# Patient Record
Sex: MALE | Race: BLACK OR AFRICAN AMERICAN | NOT HISPANIC OR LATINO | URBAN - METROPOLITAN AREA
[De-identification: names, ages, dates, MRNs, and addresses within clinical notes are randomized per-mention and may not be internally consistent; named-entity substitution may affect disease eponyms.]

---

## 2022-07-09 ENCOUNTER — TELEPHONE ENCOUNTER (OUTPATIENT)
Dept: URBAN - METROPOLITAN AREA CLINIC 121 | Facility: CLINIC | Age: 27
End: 2022-07-09

## 2022-07-10 ENCOUNTER — TELEPHONE ENCOUNTER (OUTPATIENT)
Dept: URBAN - METROPOLITAN AREA CLINIC 121 | Facility: CLINIC | Age: 27
End: 2022-07-10

## 2022-08-03 ENCOUNTER — WEB ENCOUNTER (OUTPATIENT)
Dept: URBAN - METROPOLITAN AREA CLINIC 57 | Facility: CLINIC | Age: 27
End: 2022-08-03

## 2022-08-03 ENCOUNTER — OFFICE VISIT (OUTPATIENT)
Dept: URBAN - METROPOLITAN AREA CLINIC 57 | Facility: CLINIC | Age: 27
End: 2022-08-03
Payer: COMMERCIAL

## 2022-08-03 VITALS
OXYGEN SATURATION: 98 % | SYSTOLIC BLOOD PRESSURE: 138 MMHG | BODY MASS INDEX: 35.25 KG/M2 | TEMPERATURE: 98.2 F | HEART RATE: 58 BPM | WEIGHT: 232.6 LBS | DIASTOLIC BLOOD PRESSURE: 88 MMHG | HEIGHT: 68 IN

## 2022-08-03 DIAGNOSIS — R79.89 ABNORMAL LFTS: ICD-10-CM

## 2022-08-03 DIAGNOSIS — R10.13 EPIGASTRIC ABDOMINAL PAIN: ICD-10-CM

## 2022-08-03 PROCEDURE — 99204 OFFICE O/P NEW MOD 45 MIN: CPT | Performed by: INTERNAL MEDICINE

## 2022-08-03 RX ORDER — LISINOPRIL 40 MG/1
1 TABLET TABLET ORAL ONCE A DAY
Status: ACTIVE | COMMUNITY

## 2022-08-03 NOTE — HPI-TODAY'S VISIT:
Referred here for evaluation of abnormal LFTs and fatty liver. He has been complaining of some abdominal bloating and discomfort especially if he eats certain heavy foods. During his work-up, he was noted to have elevated transaminases, had Ultrasound which showed some mild fatty infiltration. He states that he had a FibroScan done this morning. He also had an acute hepatitis profile done which was negative. Patient denies any signs or symptoms of decompensated liver disease. He does have occasional indigestion and reflux depending on what he eats. No history of alcohol excess. He does take a protein supplement once a day. Denies any OTC supplements or bodybuilding supplements. He does tend to gain weight but he states its mostly muscle mass.

## 2022-08-18 LAB — IMMUNOGLOBULIN A: (no result)

## 2022-08-24 LAB
% SATURATION: 34
A/G RATIO: 1.8
ACTIN (SMOOTH MUSCLE) ANTIBODY (IGG): <20
ALBUMIN: 4.6
ALKALINE PHOSPHATASE: 41
ALPHA-1-ANTITRYPSIN (AAT) PHENOTYPE: (no result)
ALT (SGPT): 19
ANA SCREEN, IFA: NEGATIVE
AST (SGOT): 22
BILIRUBIN, TOTAL: 0.5
BUN/CREATININE RATIO: (no result)
BUN: 14
CALCIUM: 9.7
CARBON DIOXIDE, TOTAL: 27
CHLORIDE: 103
CREATININE: 1
EGFR: 104
FERRITIN: 232
GLOBULIN, TOTAL: 2.6
GLUCOSE: 85
HEREDITARY HEMOCHROMATOSIS DNA MUT: (no result)
IRON BINDING CAPACITY: 313
IRON, TOTAL: 107
MITOCHONDRIAL (M2) ANTIBODY: <=20
POTASSIUM: 4.3
PROTEIN, TOTAL: 7.2
RHEUMATOID FACTOR: <14
SJOGREN'S ANTIBODY (SS-A): (no result)
SJOGREN'S ANTIBODY (SS-B): (no result)
SODIUM: 140

## 2022-08-30 ENCOUNTER — TELEPHONE ENCOUNTER (OUTPATIENT)
Dept: URBAN - METROPOLITAN AREA SURGERY CENTER 4 | Facility: SURGERY CENTER | Age: 27
End: 2022-08-30

## 2022-08-30 ENCOUNTER — OFFICE VISIT (OUTPATIENT)
Dept: URBAN - METROPOLITAN AREA CLINIC 63 | Facility: CLINIC | Age: 27
End: 2022-08-30

## 2022-08-30 RX ORDER — LISINOPRIL 40 MG/1
1 TABLET TABLET ORAL ONCE A DAY
COMMUNITY

## 2022-08-30 NOTE — HPI-PREVIOUS IMAGING
Ultrasound abdomen 06/13/2022 Impression: 1.  Increased echogenicity of the hepatic parenchyma suggestive of hepatic steatosis and-or hepatocellular disease.  Correlation with FibroScan recommended.

## 2022-08-30 NOTE — HPI-PREVIOUS LABS
Laboratory results 08/12/2022 CMP unremarkable Smooth muscle antibody negative Mitochondrial antibody negative Alpha-1 antitrypsin phenotype PI MM LISSETH screen negative Iron indices and ferritin within normal limits Hereditary hemochromatosis DNA mutation negative  Laboratory results 06/08/2022 CBC unremarkable CMP  ALT 60 Hemoglobin A1c within normal limits TSH within normal limits Acute hepatitis panel negative for hepatitis A, B and C

## 2022-08-30 NOTE — HPI-TODAY'S VISIT:
Mr. Ramos is a pleasant 27-year-old male evaluated in follow-up of abnormal LFTs and fatty liver.  He was previously evaluated 08/03/2022 as a new patient with complaint of bloating and abdominal discomfort if eating heavy heavy foods.  Incidental finding of elevated transaminases and fatty liver.  He reported acute hepatitis profile was negative. Denied history of excess alcohol.  Admitted taking daily protein supplement, denied additional OTC supplements. Labs were ordered to rule out autoimmune hepatitis and hemochromatosis. He was advised to lose weight, consider taking vitamin E 400 unit daily, trial of Pepcid OTC.

## 2023-03-21 ENCOUNTER — TELEPHONE ENCOUNTER (OUTPATIENT)
Dept: URBAN - METROPOLITAN AREA CLINIC 63 | Facility: CLINIC | Age: 28
End: 2023-03-21

## 2023-03-29 ENCOUNTER — WEB ENCOUNTER (OUTPATIENT)
Dept: URBAN - METROPOLITAN AREA CLINIC 60 | Facility: CLINIC | Age: 28
End: 2023-03-29

## 2023-03-29 ENCOUNTER — OFFICE VISIT (OUTPATIENT)
Dept: URBAN - METROPOLITAN AREA CLINIC 60 | Facility: CLINIC | Age: 28
End: 2023-03-29
Payer: COMMERCIAL

## 2023-03-29 ENCOUNTER — LAB OUTSIDE AN ENCOUNTER (OUTPATIENT)
Dept: URBAN - METROPOLITAN AREA CLINIC 60 | Facility: CLINIC | Age: 28
End: 2023-03-29

## 2023-03-29 VITALS
HEIGHT: 68 IN | BODY MASS INDEX: 33.56 KG/M2 | HEART RATE: 62 BPM | TEMPERATURE: 98.4 F | SYSTOLIC BLOOD PRESSURE: 126 MMHG | DIASTOLIC BLOOD PRESSURE: 74 MMHG | WEIGHT: 221.4 LBS | OXYGEN SATURATION: 100 %

## 2023-03-29 DIAGNOSIS — K76.0 FATTY LIVER: ICD-10-CM

## 2023-03-29 DIAGNOSIS — R12 HEARTBURN: ICD-10-CM

## 2023-03-29 PROBLEM — 197321007: Status: ACTIVE | Noted: 2023-03-29

## 2023-03-29 PROCEDURE — 99214 OFFICE O/P EST MOD 30 MIN: CPT | Performed by: NURSE PRACTITIONER

## 2023-03-29 RX ORDER — LISINOPRIL 40 MG/1
1 TABLET TABLET ORAL ONCE A DAY
Status: ACTIVE | COMMUNITY

## 2023-03-29 RX ORDER — OMEPRAZOLE 40 MG/1
1 CAPSULE 30 MINUTES BEFORE MORNING MEAL CAPSULE, DELAYED RELEASE ORAL ONCE A DAY
Qty: 30 | Refills: 2 | OUTPATIENT
Start: 2023-03-29

## 2023-03-29 RX ORDER — CLONIDINE 0.1 MG/D
1 PATCH TO SKIN PATCH TRANSDERMAL
Status: ACTIVE | COMMUNITY

## 2023-03-29 NOTE — PHYSICAL EXAM GASTROINTESTINAL
Abdomen , soft, moderate tenderness in the epigastrium and ruq, positive og's sign, nondistended , no guarding or rigidity , no masses palpable , normal bowel sounds , Liver and Spleen , no hepatomegaly present , no hepatosplenomegaly , spleen not palpable, No Ascites, No hernia

## 2023-03-29 NOTE — HPI-ABDOMINAL PAIN
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 Feels heartburn and regurgitation most days. Occasional nausea. Some chest pressure depending on positioning. Tender in the epigastrium and ruq. Positive og's sign but more tender in epigastrium CTA of abd unremarkable other than dialated cbd, fu mri/mrcp unremarkable. US abd shows hyperechoic liver but otherwise normal. 02/2023 Fibroscan s0/f0. BMI is high but very muscular and works actively. Has not tried any acid blockers, no h pylori testing or prior egd. Symptoms all worse with food. Transferred care from [facility]

## 2023-04-12 ENCOUNTER — TELEPHONE ENCOUNTER (OUTPATIENT)
Dept: URBAN - METROPOLITAN AREA CLINIC 60 | Facility: CLINIC | Age: 28
End: 2023-04-12

## 2023-04-13 ENCOUNTER — TELEPHONE ENCOUNTER (OUTPATIENT)
Dept: URBAN - METROPOLITAN AREA CLINIC 60 | Facility: CLINIC | Age: 28
End: 2023-04-13

## 2023-04-17 ENCOUNTER — TELEPHONE ENCOUNTER (OUTPATIENT)
Dept: URBAN - METROPOLITAN AREA CLINIC 60 | Facility: CLINIC | Age: 28
End: 2023-04-17

## 2023-04-19 ENCOUNTER — TELEPHONE ENCOUNTER (OUTPATIENT)
Dept: URBAN - METROPOLITAN AREA CLINIC 60 | Facility: CLINIC | Age: 28
End: 2023-04-19

## 2023-05-08 ENCOUNTER — CLAIMS CREATED FROM THE CLAIM WINDOW (OUTPATIENT)
Dept: URBAN - METROPOLITAN AREA SURGERY CENTER 4 | Facility: SURGERY CENTER | Age: 28
End: 2023-05-08
Payer: COMMERCIAL

## 2023-05-08 DIAGNOSIS — K31.89 ACQUIRED DEFORMITY OF DUODENUM: ICD-10-CM

## 2023-05-08 DIAGNOSIS — K20.90 ACUTE ESOPHAGITIS: ICD-10-CM

## 2023-05-08 DIAGNOSIS — K44.9 DIAPHRAGMATIC HERNIA: ICD-10-CM

## 2023-05-08 PROCEDURE — 43239 EGD BIOPSY SINGLE/MULTIPLE: CPT | Performed by: INTERNAL MEDICINE

## 2023-05-08 RX ORDER — OMEPRAZOLE 40 MG/1
1 CAPSULE 30 MINUTES BEFORE MORNING MEAL CAPSULE, DELAYED RELEASE ORAL ONCE A DAY
Qty: 30 | Refills: 2 | Status: ACTIVE | COMMUNITY
Start: 2023-03-29

## 2023-05-08 RX ORDER — CLONIDINE 0.1 MG/D
1 PATCH TO SKIN PATCH TRANSDERMAL
Status: ACTIVE | COMMUNITY

## 2023-05-08 RX ORDER — LISINOPRIL 40 MG/1
1 TABLET TABLET ORAL ONCE A DAY
Status: ACTIVE | COMMUNITY

## 2023-05-18 ENCOUNTER — OFFICE VISIT (OUTPATIENT)
Dept: URBAN - METROPOLITAN AREA CLINIC 60 | Facility: CLINIC | Age: 28
End: 2023-05-18
Payer: COMMERCIAL

## 2023-05-18 ENCOUNTER — LAB OUTSIDE AN ENCOUNTER (OUTPATIENT)
Dept: URBAN - METROPOLITAN AREA CLINIC 60 | Facility: CLINIC | Age: 28
End: 2023-05-18

## 2023-05-18 VITALS
TEMPERATURE: 98 F | HEART RATE: 60 BPM | DIASTOLIC BLOOD PRESSURE: 70 MMHG | HEIGHT: 68 IN | OXYGEN SATURATION: 98 % | WEIGHT: 224 LBS | SYSTOLIC BLOOD PRESSURE: 122 MMHG | BODY MASS INDEX: 33.95 KG/M2

## 2023-05-18 DIAGNOSIS — R10.11 RIGHT UPPER QUADRANT PAIN: ICD-10-CM

## 2023-05-18 DIAGNOSIS — K20.90 ESOPHAGITIS: ICD-10-CM

## 2023-05-18 PROCEDURE — 99214 OFFICE O/P EST MOD 30 MIN: CPT | Performed by: NURSE PRACTITIONER

## 2023-05-18 RX ORDER — CLONIDINE 0.1 MG/D
1 PATCH TO SKIN PATCH TRANSDERMAL
Status: ACTIVE | COMMUNITY

## 2023-05-18 RX ORDER — LISINOPRIL 40 MG/1
1 TABLET TABLET ORAL ONCE A DAY
Status: ACTIVE | COMMUNITY

## 2023-05-18 RX ORDER — OMEPRAZOLE 40 MG/1
1 CAPSULE 30 MINUTES BEFORE MORNING MEAL CAPSULE, DELAYED RELEASE ORAL ONCE A DAY
Qty: 30 | Refills: 2 | Status: ACTIVE | COMMUNITY
Start: 2023-03-29

## 2023-05-18 NOTE — HPI-ABDOMINAL PAIN
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 Was feeling heartburn and regurgitation most days but this has resolved with dialy ppi.  Now still having RUQ pain 60% of days in the last month. Occasional nausea if the pain is very bad. Epigastric tenderness has resolved, negative og's sign today but positive last visit. When the ruq pain is bad it hurts to take a deep breath. This pain seems to be most dietary in nature, if he eats at home he tends not to get the pain.  CTA of abd unremarkable other than dialated cbd, fu mri/mrcp unremarkable. US abd shows hyperechoic liver but otherwise normal. 02/2023 Fibroscan s0/f0. BMI is high but very muscular. Symptoms all worse with food.

## 2023-05-18 NOTE — HPI-ENDOSCOPY (EGD) FOLLOWUP
05/2023 LA grade A esophagitis with chronic cardititis and polypoid change on pathology H pylori negative.

## 2023-06-16 ENCOUNTER — TELEPHONE ENCOUNTER (OUTPATIENT)
Dept: URBAN - METROPOLITAN AREA CLINIC 60 | Facility: CLINIC | Age: 28
End: 2023-06-16

## 2023-06-21 ENCOUNTER — TELEPHONE ENCOUNTER (OUTPATIENT)
Dept: URBAN - METROPOLITAN AREA CLINIC 60 | Facility: CLINIC | Age: 28
End: 2023-06-21

## 2023-06-22 PROBLEM — 20824003: Status: ACTIVE | Noted: 2023-06-22

## 2023-06-26 ENCOUNTER — OFFICE VISIT (OUTPATIENT)
Dept: URBAN - METROPOLITAN AREA CLINIC 63 | Facility: CLINIC | Age: 28
End: 2023-06-26

## 2023-07-10 ENCOUNTER — OFFICE VISIT (OUTPATIENT)
Dept: URBAN - METROPOLITAN AREA CLINIC 63 | Facility: CLINIC | Age: 28
End: 2023-07-10

## 2023-07-13 ENCOUNTER — ERX REFILL RESPONSE (OUTPATIENT)
Dept: URBAN - METROPOLITAN AREA CLINIC 60 | Facility: CLINIC | Age: 28
End: 2023-07-13

## 2023-07-13 RX ORDER — OMEPRAZOLE 40 MG/1
1 CAPSULE 30 MINUTES BEFORE MORNING MEAL CAPSULE, DELAYED RELEASE ORAL ONCE A DAY
Qty: 30 | Refills: 2 | OUTPATIENT

## 2023-07-13 RX ORDER — OMEPRAZOLE 40 MG/1
TAKE 1 CAPSULE BY MOUTH EVERY DAY 30 MINUTES BEFORE BREAKFAST CAPSULE, DELAYED RELEASE ORAL
Qty: 30 CAPSULE | Refills: 11 | OUTPATIENT

## 2023-08-04 ENCOUNTER — OFFICE VISIT (OUTPATIENT)
Dept: URBAN - METROPOLITAN AREA CLINIC 63 | Facility: CLINIC | Age: 28
End: 2023-08-04

## 2023-08-29 ENCOUNTER — OFFICE VISIT (OUTPATIENT)
Dept: URBAN - METROPOLITAN AREA CLINIC 63 | Facility: CLINIC | Age: 28
End: 2023-08-29

## 2023-08-30 ENCOUNTER — OFFICE VISIT (OUTPATIENT)
Dept: URBAN - METROPOLITAN AREA CLINIC 60 | Facility: CLINIC | Age: 28
End: 2023-08-30

## 2023-09-15 ENCOUNTER — OFFICE VISIT (OUTPATIENT)
Dept: URBAN - METROPOLITAN AREA CLINIC 63 | Facility: CLINIC | Age: 28
End: 2023-09-15

## 2023-10-31 ENCOUNTER — TELEPHONE ENCOUNTER (OUTPATIENT)
Dept: URBAN - METROPOLITAN AREA CLINIC 60 | Facility: CLINIC | Age: 28
End: 2023-10-31

## 2023-11-02 ENCOUNTER — LAB OUTSIDE AN ENCOUNTER (OUTPATIENT)
Dept: URBAN - METROPOLITAN AREA CLINIC 60 | Facility: CLINIC | Age: 28
End: 2023-11-02

## 2023-11-06 ENCOUNTER — TELEPHONE ENCOUNTER (OUTPATIENT)
Dept: URBAN - METROPOLITAN AREA CLINIC 64 | Facility: CLINIC | Age: 28
End: 2023-11-06

## 2023-11-06 ENCOUNTER — TELEPHONE ENCOUNTER (OUTPATIENT)
Dept: URBAN - METROPOLITAN AREA CLINIC 63 | Facility: CLINIC | Age: 28
End: 2023-11-06

## 2023-11-07 ENCOUNTER — LAB OUTSIDE AN ENCOUNTER (OUTPATIENT)
Dept: URBAN - METROPOLITAN AREA CLINIC 64 | Facility: CLINIC | Age: 28
End: 2023-11-07

## 2023-11-22 ENCOUNTER — TELEPHONE ENCOUNTER (OUTPATIENT)
Dept: URBAN - METROPOLITAN AREA CLINIC 60 | Facility: CLINIC | Age: 28
End: 2023-11-22

## 2023-11-29 ENCOUNTER — LAB OUTSIDE AN ENCOUNTER (OUTPATIENT)
Dept: URBAN - METROPOLITAN AREA CLINIC 60 | Facility: CLINIC | Age: 28
End: 2023-11-29

## 2023-11-29 ENCOUNTER — OFFICE VISIT (OUTPATIENT)
Dept: URBAN - METROPOLITAN AREA CLINIC 60 | Facility: CLINIC | Age: 28
End: 2023-11-29
Payer: COMMERCIAL

## 2023-11-29 VITALS
TEMPERATURE: 98.8 F | HEIGHT: 68 IN | SYSTOLIC BLOOD PRESSURE: 126 MMHG | OXYGEN SATURATION: 98 % | WEIGHT: 236 LBS | BODY MASS INDEX: 35.77 KG/M2 | DIASTOLIC BLOOD PRESSURE: 78 MMHG | HEART RATE: 60 BPM

## 2023-11-29 DIAGNOSIS — R12 HEARTBURN: ICD-10-CM

## 2023-11-29 DIAGNOSIS — K82.8 GALLBLADDER SLUDGE: ICD-10-CM

## 2023-11-29 DIAGNOSIS — R11.0 NAUSEA: ICD-10-CM

## 2023-11-29 DIAGNOSIS — R10.11 RIGHT UPPER QUADRANT PAIN: ICD-10-CM

## 2023-11-29 PROCEDURE — 99215 OFFICE O/P EST HI 40 MIN: CPT | Performed by: NURSE PRACTITIONER

## 2023-11-29 RX ORDER — OMEPRAZOLE 40 MG/1
1 CAPSULE 30 MINUTES BEFORE MORNING MEAL CAPSULE, DELAYED RELEASE ORAL ONCE A DAY
Qty: 30 | Refills: 2 | OUTPATIENT
Start: 2023-11-29

## 2023-11-29 RX ORDER — LISINOPRIL 40 MG/1
1 TABLET TABLET ORAL ONCE A DAY
Status: ACTIVE | COMMUNITY

## 2023-11-29 RX ORDER — SPIRONOLACTONE 25 MG/1
1 TABLET TABLET, FILM COATED ORAL
Status: ACTIVE | COMMUNITY

## 2023-11-29 NOTE — HPI-ABDOMINAL PAIN
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  CTA of abd unremarkable other than dialated cbd, fu mri/mrcp unremarkable. US abd shows hyperechoic liver but otherwise normal. 02/2023 Fibroscan s0/f0. BMI is high but very muscular. Symptoms all worse with food.

## 2023-11-29 NOTE — HPI-HOSPITAL FOLLOW-UP
Patient seen at [facility] 2 days ago and had us showing gb sludge and amylase of 134. Lipase was 29 and no CT done, normal wbc of 4.8. These results are in no way indicative of acute pancreatitis.  Patient saw Dr Lewis whom gave him ursodiol which he took for 3 days and then stopped. States he has seen 3 different surgeons that all want to remove his gallbladder.  He took the omeprazole 40mg we gave him for 3 months which helped with his heartburn. Stopped it because his pcp(online) told him to.  Now having burning in his throat 1-2 days per week, usually in the evening. Takes just tums for this and sometimes has to take tums a few times to resolve it.  His main complain is right upper quadrant pain/pressure. Worse after meals, worse with fatty meals. Present daily but not constant. Not better with ppi. Has positive og's sign in the office today.  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 He is having 3 episodes of diarrhea 3 days per week and normal bm's the rest of the time.  He had 2 pipida scans this year, one with fatty meal which was abnormal and one with cck injection that showed ef 95%. He is nervous to have his gb out but is insistent something is wrong, he believes he has systemic inflammation. He was told at some point he had inflammation around his heart, had abnormal echo then normal mri. He is unable to elaborate on this. States he has new intermittent blurred vision starting a month ago, he believes it is blood sugar related. He has no hx of diabetes, states non-fasting blood glucose at home has been 130-138 lately, fasting glucose at ER was 92 2 days ago.  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

## 2023-12-07 LAB
CALPROTECTIN, FECAL: 247
CAMPYLOBACTER SPP. AG,EIA: (no result)
CRYPTOSPORIDIUM ANTIGEN,: (no result)
GIARDIA AG, EIA, STOOL: (no result)
LEUKOCYTES: (no result)
OVA AND PARASITES, CONC AND PERM SMEAR: (no result)
PANCREATIC ELASTASE, FECAL: >500
ROTAVIRUS AG, EIA: (no result)
SALMONELLA AND SHIGELLA, CULTURE: (no result)
SHIGA TOXINS, EIA W/RFL TO E.COLI O157 CULTURE: (no result)

## 2023-12-08 ENCOUNTER — TELEPHONE ENCOUNTER (OUTPATIENT)
Dept: URBAN - METROPOLITAN AREA CLINIC 60 | Facility: CLINIC | Age: 28
End: 2023-12-08

## 2023-12-08 RX ORDER — METRONIDAZOLE 250 MG/1
1 TABLET TABLET ORAL THREE TIMES A DAY
Qty: 30 | OUTPATIENT
Start: 2023-12-11 | End: 2023-12-21

## 2023-12-11 ENCOUNTER — TELEPHONE ENCOUNTER (OUTPATIENT)
Dept: URBAN - METROPOLITAN AREA CLINIC 60 | Facility: CLINIC | Age: 28
End: 2023-12-11

## 2023-12-14 ENCOUNTER — OFFICE VISIT (OUTPATIENT)
Dept: URBAN - METROPOLITAN AREA CLINIC 60 | Facility: CLINIC | Age: 28
End: 2023-12-14

## 2023-12-18 ENCOUNTER — TELEPHONE ENCOUNTER (OUTPATIENT)
Dept: URBAN - METROPOLITAN AREA CLINIC 64 | Facility: CLINIC | Age: 28
End: 2023-12-18

## 2023-12-21 ENCOUNTER — OFFICE VISIT (OUTPATIENT)
Dept: URBAN - METROPOLITAN AREA CLINIC 63 | Facility: CLINIC | Age: 28
End: 2023-12-21

## 2023-12-21 RX ORDER — METRONIDAZOLE 250 MG/1
1 TABLET TABLET ORAL THREE TIMES A DAY
Qty: 30 | Status: ACTIVE | COMMUNITY
Start: 2023-12-11 | End: 2023-12-21

## 2023-12-21 RX ORDER — LISINOPRIL 40 MG/1
1 TABLET TABLET ORAL ONCE A DAY
Status: ACTIVE | COMMUNITY

## 2023-12-21 RX ORDER — SPIRONOLACTONE 25 MG/1
1 TABLET TABLET, FILM COATED ORAL
Status: ACTIVE | COMMUNITY

## 2023-12-21 RX ORDER — OMEPRAZOLE 40 MG/1
1 CAPSULE 30 MINUTES BEFORE MORNING MEAL CAPSULE, DELAYED RELEASE ORAL ONCE A DAY
Qty: 30 | Refills: 2 | Status: ACTIVE | COMMUNITY
Start: 2023-11-29

## 2023-12-21 RX ORDER — OMEPRAZOLE 40 MG/1
1 CAPSULE 30 MINUTES BEFORE MORNING MEAL CAPSULE, DELAYED RELEASE ORAL ONCE A DAY
Qty: 30 | Refills: 2 | OUTPATIENT

## 2023-12-21 NOTE — HPI-HOSPITAL FOLLOW-UP
Patient seen at [facility] 2 days ago and had us showing gb sludge and amylase of 134. Lipase was 29 and no CT done, normal wbc of 4.8. These results are in no way indicative of acute pancreatitis.  Patient saw Dr Lewis whom gave him ursodiol which he took for 3 days and then stopped. States he has seen 3 different surgeons that all want to remove his gallbladder.  He took the omeprazole 40mg we gave him for 3 months which helped with his heartburn. Stopped it because his pcp(online) told him to.  Now having burning in his throat 1-2 days per week, usually in the evening. Takes just tums for this and sometimes has to take tums a few times to resolve it.  His main complain is right upper quadrant pain/pressure. Worse after meals, worse with fatty meals. Present daily but not constant. Not better with ppi. Has positive og's sign in the office today.  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 He is having 3 episodes of diarrhea 3 days per week and normal bm's the rest of the time.  He had 2 pipida scans this year, one with fatty meal which was abnormal and one with cck injection that showed ef 95%. He is nervous to have his gb out but is insistent something is wrong, he believes he has systemic inflammation. He was told at some point he had inflammation around his heart, had abnormal echo then normal mri. He is unable to elaborate on this. States he has new intermittent blurred vision starting a month ago, he believes it is blood sugar related. He has no hx of diabetes, states non-fasting blood glucose at home has been 130-138 lately, fasting glucose at ER was 92 2 days ago.  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

## 2023-12-21 NOTE — HPI-PREVIOUS IMAGING
No previous GI imaging to review. Upper endoscopy May 2023: LA grade A esophagitis-pathology noting chronic carditis with polypoid change but without intestinal metaplasia, 1 cm hiatal hernia, mild H. pylori negative erythematous gastritis, normal duodenum with unremarkable biopsies  MRI abdomen WITH AND W/O CONTRAST DEC 2023 Normal liver size with smooth surface contour, no significant fatty infiltration, no focal lesions, nondilated gallbladder without definitive stones, common bile duct 6 mm with normal smooth gradual tapering at the ampulla no significant intrahepatic biliary dilation, no filling defect to suggest choledocholithiasis, normal pancreas.  Normal caliber pancreatic duct, no acute peripancreatic inflammation, normal spleen, no lymphadenopathy, Questionable beaking and unnatural course of the nondilated sigmoid colon and small bowel.  In the left lower quadrant-internal hernia is not excluded.  No evidence of obstruction.  CT enterography was suggested to better evaluate the small bowel  HIDA scan November 2023: 95% gallbladder ejection fraction  HIDA scan June 2023:Ejection fraction 22% CT angiogram  CT angiogram abdominal pelvis March 2023 Normal study FibroScan March 2023: S0/F0 Abdominal ultrasound March 2023: Normal gallbladder, negative Quarles sign, normal wall thickness, CBD 9 mm, liver is normal size.  Echogenicity is hyperechoic, normal contour, no focal lesions, MRCP March 2023: Liver is normal size with smooth contour, nondilated gallbladder, normal CBD, normal configuration of the pancreas without pancreatitis or chronic fibrosis, normal caliber main pancreatic duct.  Normal spleen

## 2023-12-28 ENCOUNTER — TELEPHONE ENCOUNTER (OUTPATIENT)
Dept: URBAN - METROPOLITAN AREA CLINIC 60 | Facility: CLINIC | Age: 28
End: 2023-12-28

## 2024-01-23 ENCOUNTER — TELEPHONE ENCOUNTER (OUTPATIENT)
Dept: URBAN - METROPOLITAN AREA CLINIC 63 | Facility: CLINIC | Age: 29
End: 2024-01-23

## 2024-01-25 ENCOUNTER — OFFICE VISIT (OUTPATIENT)
Dept: URBAN - METROPOLITAN AREA CLINIC 60 | Facility: CLINIC | Age: 29
End: 2024-01-25

## 2024-01-26 ENCOUNTER — OFFICE VISIT (OUTPATIENT)
Dept: URBAN - METROPOLITAN AREA CLINIC 60 | Facility: CLINIC | Age: 29
End: 2024-01-26

## 2024-02-16 LAB
A/G RATIO: 2
ABSOLUTE BASOPHILS: 42
ABSOLUTE EOSINOPHILS: 382
ABSOLUTE LYMPHOCYTES: 1638
ABSOLUTE MONOCYTES: 408
ABSOLUTE NEUTROPHILS: 2830
ALBUMIN: 5
ALKALINE PHOSPHATASE: 47
ALT (SGPT): 22
AST (SGOT): 12
BASOPHILS: 0.8
BILIRUBIN, TOTAL: 0.5
BUN/CREATININE RATIO: (no result)
BUN: 10
C-REACTIVE PROTEIN, QUANT: 2.2
CALCIUM: 10.1
CARBON DIOXIDE, TOTAL: 29
CHLORIDE: 104
CREATININE: 1.04
EGFR: 99
EOSINOPHILS: 7.2
GLOBULIN, TOTAL: 2.5
GLUCOSE: 94
HEMATOCRIT: 46.9
HEMOGLOBIN: 15.6
LIPASE: 16
LYMPHOCYTES: 30.9
MCH: 28.5
MCHC: 33.3
MCV: 85.6
MONOCYTES: 7.7
MPV: 12.7
NEUTROPHILS: 53.4
PLATELET COUNT: 180
POTASSIUM: 4.1
PROTEIN, TOTAL: 7.5
RDW: 13.3
RED BLOOD CELL COUNT: 5.48
SODIUM: 143
WHITE BLOOD CELL COUNT: 5.3

## 2024-03-20 ENCOUNTER — OV EP (OUTPATIENT)
Dept: URBAN - METROPOLITAN AREA CLINIC 63 | Facility: CLINIC | Age: 29
End: 2024-03-20
Payer: COMMERCIAL

## 2024-03-20 VITALS
OXYGEN SATURATION: 98 % | BODY MASS INDEX: 35.92 KG/M2 | HEIGHT: 68 IN | DIASTOLIC BLOOD PRESSURE: 82 MMHG | HEART RATE: 67 BPM | TEMPERATURE: 97.8 F | WEIGHT: 237 LBS | SYSTOLIC BLOOD PRESSURE: 128 MMHG

## 2024-03-20 DIAGNOSIS — R10.13 EPIGASTRIC DISCOMFORT: ICD-10-CM

## 2024-03-20 DIAGNOSIS — H53.9 VISION CHANGES: ICD-10-CM

## 2024-03-20 DIAGNOSIS — Z90.49 HISTORY OF CHOLECYSTECTOMY: ICD-10-CM

## 2024-03-20 DIAGNOSIS — K58.9 IRRITABLE BOWEL SYNDROME, UNSPECIFIED TYPE: ICD-10-CM

## 2024-03-20 PROBLEM — 10743008: Status: ACTIVE | Noted: 2024-03-20

## 2024-03-20 PROBLEM — 428882003: Status: ACTIVE | Noted: 2024-03-20

## 2024-03-20 PROCEDURE — 99214 OFFICE O/P EST MOD 30 MIN: CPT

## 2024-03-20 RX ORDER — LISINOPRIL 40 MG/1
1 TABLET TABLET ORAL ONCE A DAY
Status: ACTIVE | COMMUNITY

## 2024-03-20 RX ORDER — SPIRONOLACTONE 25 MG/1
1 TABLET TABLET, FILM COATED ORAL
Status: ACTIVE | COMMUNITY

## 2024-03-20 RX ORDER — DICYCLOMINE HYDROCHLORIDE 10 MG/1
2 CAPSULES CAPSULE ORAL THREE TIMES A DAY
Qty: 180 | OUTPATIENT
Start: 2024-03-20 | End: 2024-04-19

## 2024-03-20 NOTE — HPI-ZZZTODAY'S VISIT
Cindy is a very pleasant 28-year-old male who presents for follow-up.  Past medical history significant for hypertension, obesity.  Past surgical history significant for cholecystectomy(03/08/2024).  Denies previous colonoscopy. Last endoscopy May 2023.  Family history noncontributory. Patient was last seen on 11/29/2023 by TINY Slaughter for hospital follow-up.  The following was relayed at that visit: "Patient seen at [facility] 2 days ago and had us showing gb sludge and amylase of 134. Lipase was 29 and no CT done, normal wbc of 4.8. These results are in no way indicative of acute pancreatitis. Patient saw Dr Lewis whom gave him ursodiol which he took for 3 days and then stopped. States he has seen 3 different surgeons that all want to remove his gallbladder. He took the omeprazole 40mg we gave him for 3 months which helped with his heartburn. Stopped it because his pcp(online) told him to.  Now having burning in his throat 1-2 days per week, usually in the evening. Takes just tums for this and sometimes has to take tums a few times to resolve it. His main complain is right upper quadrant pain/pressure. Worse after meals, worse with fatty meals. Present daily but not constant. Not better with ppi. Has positive og's sign in the office today. 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 He is having 3 episodes of diarrhea 3 days per week and normal bm's the rest of the time. He had 2 pipida scans this year, one with fatty meal which was abnormal and one with cck injection that showed ef 95%. He is nervous to have his gb out but is insistent something is wrong, he believes he has systemic inflammation. He was told at some point he had inflammation around his heart, had abnormal echo then normal mri. He is unable to elaborate on this. States he has new intermittent blurred vision starting a month ago, he believes it is blood sugar related. He has no hx of diabetes, states non-fasting blood glucose at home has been 130-138 lately, fasting glucose at ER was 92 2 days ago. 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 CTA of abd unremarkable other than dialated cbd, fu mri/mrcp unremarkable. US abd shows hyperechoic liver but otherwise normal. 02/2023 Fibroscan s0/f0. BMI is high but very muscular. Symptoms all worse with food."   Cindy presents today for follow up. He states he had the cholecystectomy on 03/08/2024 but continues to have the same symptoms. When questioned what those symptoms are he states face changes, tingling and blurred vision. He postulates that he is having these changes because he has a parasite that is leading to decreased absorption of a nutrient. 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 He did go to [facility] for abdominal pain on 03/10/2024 because he thought he was having another episode of pancreatitis. CT and blood work were all within normal limits and he was discharged. He continues to have epigastric discomfort, non-radiating, unchanged by prandial events or bowel movements. He denies dysphagia, dyspepsia, pyrosis, change in bowel habits, unintentional weight loss, melena, or hematochezia.

## 2024-03-20 NOTE — HPI-PREVIOUS IMAGING
CT ab and pelivs with contrast1. Post cholecystectomy. Minimal fluid collection in the gallbladder fossa. 2. Free extraperitoneal air onthe right side and in the anterior chestwall. 3. Probable cystitis.  MRI abdomen WITH AND W/O CONTRAST DEC 2023 Normal liver size with smooth surface contour, no significant fatty infiltration, no focal lesions, nondilated gallbladder without definitive stones, common bile duct 6 mm with normal smooth gradual tapering at the ampulla no significant intrahepatic biliary dilation, no filling defect to suggest choledocholithiasis, normal pancreas.  Normal caliber pancreatic duct, no acute peripancreatic inflammation, normal spleen, no lymphadenopathy, Questionable beaking and unnatural course of the nondilated sigmoid colon and small bowel.  In the left lower quadrant-internal hernia is not excluded.  No evidence of obstruction.  CT enterography was suggested to better evaluate the small bowel  HIDA scan November 2023: 95% gallbladder ejection fraction  HIDA scan June 2023:Ejection fraction 22% CT angiogram  CT angiogram abdominal pelvis March 2023 Normal study FibroScan March 2023: S0/F0 Abdominal ultrasound March 2023: Normal gallbladder, negative Quarles sign, normal wall thickness, CBD 9 mm, liver is normal size.  Echogenicity is hyperechoic, normal contour, no focal lesions, MRCP March 2023: Liver is normal size with smooth contour, nondilated gallbladder, normal CBD, normal configuration of the pancreas without pancreatitis or chronic fibrosis, normal caliber main pancreatic duct.  Normal spleen

## 2024-03-20 NOTE — HPI-PREVIOUS PROCEDURES
Upper endoscopy May 2023: LA grade A esophagitis-pathology noting chronic carditis with polypoid change but without intestinal metaplasia, 1 cm hiatal hernia, mild H. pylori negative erythematous gastritis, normal duodenum with unremarkable biopsies

## 2025-04-17 ENCOUNTER — OFFICE VISIT (OUTPATIENT)
Dept: URBAN - METROPOLITAN AREA CLINIC 63 | Facility: CLINIC | Age: 30
End: 2025-04-17

## 2025-04-17 RX ORDER — LISINOPRIL 40 MG/1
1 TABLET TABLET ORAL ONCE A DAY
Status: ACTIVE | COMMUNITY

## 2025-04-17 RX ORDER — SPIRONOLACTONE 25 MG/1
1 TABLET TABLET, FILM COATED ORAL
Status: ACTIVE | COMMUNITY

## 2025-04-17 NOTE — HPI-ZZZTODAY'S VISIT
Patient is a 29-year-old male who presents for follow-up.  He is a patient of Dr. Avila.  I last saw him 3/20/2024. Past medical history significant for IBS for which she was given dicyclomine.  Past surgical history reviewed.  Denies prior colonoscopy.  Last endoscopy in May 2023.  Family history noncontributory. Patient is a poor historian.  We have followed the patient for abdominal pain.  He has had extensive workup in the past that was negative. Cindy is a very pleasant 28-year-old male who presents for follow-up.  Past medical history significant for hypertension, obesity.  Past surgical history significant for cholecystectomy(03/08/2024).  Denies previous colonoscopy. Last endoscopy May 2023.  Family history noncontributory. Patient was last seen on 11/29/2023 by TINY Slaughter for hospital follow-up.  The following was relayed at that visit: "Patient seen at [facility] ER 2 days ago and had us showing gb sludge and amylase of 134. Lipase was 29 and no CT done, normal wbc of 4.8. These results are in no way indicative of acute pancreatitis. Patient saw Dr Lewis whom gave him ursodiol which he took for 3 days and then stopped. States he has seen 3 different surgeons that all want to remove his gallbladder. He took the omeprazole 40mg we gave him for 3 months which helped with his heartburn. Stopped it because his pcp(online) told him to.  Now having burning in his throat 1-2 days per week, usually in the evening. Takes just tums for this and sometimes has to take tums a few times to resolve it. His main complain is right upper quadrant pain/pressure. Worse after meals, worse with fatty meals. Present daily but not constant. Not better with ppi. Has positive og's sign in the office today. 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 He is having 3 episodes of diarrhea 3 days per week and normal bm's the rest of the time. He had 2 pipida scans this year, one with fatty meal which was abnormal and one with cck injection that showed ef 95%. He is nervous to have his gb out but is insistent something is wrong, he believes he has systemic inflammation. He was told at some point he had inflammation around his heart, had abnormal echo then normal mri. He is unable to elaborate on this. States he has new intermittent blurred vision starting a month ago, he believes it is blood sugar related. He has no hx of diabetes, states non-fasting blood glucose at home has been 130-138 lately, fasting glucose at ER was 92 2 days ago. 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 CTA of abd unremarkable other than dialated cbd, fu mri/mrcp unremarkable. US abd shows hyperechoic liver but otherwise normal. 02/2023 Fibroscan s0/f0. BMI is high but very muscular. Symptoms all worse with food."   Cindy presents today for follow up. He states he had the cholecystectomy on 03/08/2024 but continues to have the same symptoms. When questioned what those symptoms are he states face changes, tingling and blurred vision. He postulates that he is having these changes because he has a parasite that is leading to decreased absorption of a nutrient. 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 He did go to [facility] ER for abdominal pain on 03/10/2024 because he thought he was having another episode of pancreatitis. CT and blood work were all within normal limits and he was discharged. He continues to have epigastric discomfort, non-radiating, unchanged by prandial events or bowel movements. He denies dysphagia, dyspepsia, pyrosis, change in bowel habits, unintentional weight loss, melena, or hematochezia.

## 2025-04-21 ENCOUNTER — OFFICE VISIT (OUTPATIENT)
Dept: URBAN - METROPOLITAN AREA CLINIC 63 | Facility: CLINIC | Age: 30
End: 2025-04-21

## 2025-04-21 RX ORDER — SPIRONOLACTONE 25 MG/1
1 TABLET TABLET, FILM COATED ORAL
Status: ACTIVE | COMMUNITY

## 2025-04-21 RX ORDER — LISINOPRIL 40 MG/1
1 TABLET TABLET ORAL ONCE A DAY
Status: ACTIVE | COMMUNITY

## 2025-04-21 NOTE — HPI-ZZZTODAY'S VISIT
Patient is a 29-year-old male who presents for follow-up.  He is a patient of Dr. Avila.  I last saw him 3/20/2024. Past medical history significant for IBS for which she was given dicyclomine.  Past surgical history reviewed.  Denies prior colonoscopy.  Last endoscopy in May 2023.  Family history noncontributory. Patient is a poor historian.  We have followed the patient for abdominal pain.  He has had extensive workup in the past that was negative. Cindy is a very pleasant 28-year-old male who presents for follow-up.  Past medical history significant for hypertension, obesity.  Past surgical history significant for cholecystectomy(03/08/2024).  Denies previous colonoscopy. Last endoscopy May 2023.  Family history noncontributory. Patient was last seen on 11/29/2023 by TINY Slaughter for hospital follow-up.  The following was relayed at that visit: "Patient seen at [facility] ER 2 days ago and had us showing gb sludge and amylase of 134. Lipase was 29 and no CT done, normal wbc of 4.8. These results are in no way indicative of acute pancreatitis. Patient saw Dr Lewis whom gave him ursodiol which he took for 3 days and then stopped. States he has seen 3 different surgeons that all want to remove his gallbladder. He took the omeprazole 40mg we gave him for 3 months which helped with his heartburn. Stopped it because his pcp(online) told him to.  Now having burning in his throat 1-2 days per week, usually in the evening. Takes just tums for this and sometimes has to take tums a few times to resolve it. His main complain is right upper quadrant pain/pressure. Worse after meals, worse with fatty meals. Present daily but not constant. Not better with ppi. Has positive og's sign in the office today. 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 He is having 3 episodes of diarrhea 3 days per week and normal bm's the rest of the time. He had 2 pipida scans this year, one with fatty meal which was abnormal and one with cck injection that showed ef 95%. He is nervous to have his gb out but is insistent something is wrong, he believes he has systemic inflammation. He was told at some point he had inflammation around his heart, had abnormal echo then normal mri. He is unable to elaborate on this. States he has new intermittent blurred vision starting a month ago, he believes it is blood sugar related. He has no hx of diabetes, states non-fasting blood glucose at home has been 130-138 lately, fasting glucose at ER was 92 2 days ago. 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 CTA of abd unremarkable other than dialated cbd, fu mri/mrcp unremarkable. US abd shows hyperechoic liver but otherwise normal. 02/2023 Fibroscan s0/f0. BMI is high but very muscular. Symptoms all worse with food."   Cindy presents today for follow up. He states he had the cholecystectomy on 03/08/2024 but continues to have the same symptoms. When questioned what those symptoms are he states face changes, tingling and blurred vision. He postulates that he is having these changes because he has a parasite that is leading to decreased absorption of a nutrient. 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 [facility] because he thought he was having another episode of pancreatitis. CT and blood work were all within normal limits and he was discharged. He continues to have epigastric discomfort, non-radiating, unchanged by prandial events or bowel movements. He denies dysphagia, dyspepsia, pyrosis, change in bowel habits, unintentional weight loss, melena, or hematochezia.